# Patient Record
Sex: MALE | Race: OTHER | HISPANIC OR LATINO | Employment: FULL TIME | URBAN - METROPOLITAN AREA
[De-identification: names, ages, dates, MRNs, and addresses within clinical notes are randomized per-mention and may not be internally consistent; named-entity substitution may affect disease eponyms.]

---

## 2022-12-03 ENCOUNTER — HOSPITAL ENCOUNTER (EMERGENCY)
Facility: HOSPITAL | Age: 22
Discharge: HOME | End: 2022-12-03
Attending: EMERGENCY MEDICINE

## 2022-12-03 VITALS
BODY MASS INDEX: 34.57 KG/M2 | HEART RATE: 109 BPM | RESPIRATION RATE: 18 BRPM | TEMPERATURE: 99.5 F | WEIGHT: 246.91 LBS | HEIGHT: 71 IN | SYSTOLIC BLOOD PRESSURE: 104 MMHG | OXYGEN SATURATION: 98 % | DIASTOLIC BLOOD PRESSURE: 70 MMHG

## 2022-12-03 DIAGNOSIS — R68.89 FLU-LIKE SYMPTOMS: Primary | ICD-10-CM

## 2022-12-03 LAB
FLUAV RNA RESP QL NAA+PROBE: POSITIVE
FLUBV RNA RESP QL NAA+PROBE: NEGATIVE
RSV RNA RESP QL NAA+PROBE: NEGATIVE
SARS-COV-2 RNA RESP QL NAA+PROBE: NEGATIVE

## 2022-12-03 RX ORDER — ACETAMINOPHEN 325 MG/1
650 TABLET ORAL ONCE
Status: COMPLETED | OUTPATIENT
Start: 2022-12-03 | End: 2022-12-03

## 2022-12-03 RX ORDER — IBUPROFEN 600 MG/1
600 TABLET ORAL ONCE
Status: COMPLETED | OUTPATIENT
Start: 2022-12-03 | End: 2022-12-03

## 2022-12-03 RX ADMIN — IBUPROFEN 600 MG: 600 TABLET, FILM COATED ORAL at 11:14

## 2022-12-03 RX ADMIN — ACETAMINOPHEN 650 MG: 325 TABLET, FILM COATED ORAL at 11:14

## 2022-12-03 NOTE — ED PROVIDER NOTES
History  Chief Complaint   Patient presents with   • Flu Symptoms     Sore throat, dry cough, headache, and high fevers for 3 days  History provided by:  Patient   used: No    Flu Symptoms  Presenting symptoms: cough, fever, headache, myalgias, rhinorrhea, shortness of breath and sore throat    Presenting symptoms: no nausea and no vomiting    Onset quality:  Sudden  Duration:  3 days  Progression:  Worsening  Chronicity:  New  Relieved by:  Nothing  Worsened by:  Nothing  Ineffective treatments:  None tried  Associated symptoms: chills and nasal congestion    Risk factors comment:  No history of cardiac or pulmonary disease      None       History reviewed  No pertinent past medical history  History reviewed  No pertinent surgical history  History reviewed  No pertinent family history  I have reviewed and agree with the history as documented  E-Cigarette/Vaping     E-Cigarette/Vaping Substances     Social History     Tobacco Use   • Smoking status: Never   • Smokeless tobacco: Never   Substance Use Topics   • Alcohol use: Not Currently   • Drug use: Not Currently       Review of Systems   Constitutional: Positive for chills and fever  HENT: Positive for congestion, rhinorrhea and sore throat  Respiratory: Positive for cough and shortness of breath  Negative for chest tightness  Cardiovascular: Negative for chest pain  Gastrointestinal: Negative for nausea and vomiting  Musculoskeletal: Positive for myalgias  Neurological: Positive for headaches  All other systems reviewed and are negative  Physical Exam  Physical Exam  Vitals and nursing note reviewed  Constitutional:       General: He is not in acute distress  Appearance: Normal appearance  He is well-developed and well-nourished  He is ill-appearing  He is not toxic-appearing, sickly-appearing or diaphoretic  HENT:      Head: Normocephalic and atraumatic        Right Ear: Hearing normal  No drainage or swelling  Left Ear: Hearing normal  No drainage or swelling  Mouth/Throat:      Mouth: Mucous membranes are normal  Mucous membranes are moist       Pharynx: No oropharyngeal exudate or posterior oropharyngeal erythema  Eyes:      General: Lids are normal          Right eye: No discharge  Left eye: No discharge  Conjunctiva/sclera: Conjunctivae normal    Neck:      Vascular: No JVD  Trachea: Trachea normal    Cardiovascular:      Rate and Rhythm: Regular rhythm  Tachycardia present  Pulses: Normal pulses and intact distal pulses  Heart sounds: Normal heart sounds  No murmur heard  No friction rub  No gallop  Pulmonary:      Effort: Pulmonary effort is normal  No respiratory distress  Breath sounds: Normal breath sounds  No stridor  No wheezing or rales  Chest:      Chest wall: No tenderness  Abdominal:      Palpations: Abdomen is soft  Tenderness: There is no abdominal tenderness  There is no CVA tenderness, guarding or rebound  Musculoskeletal:         General: No edema  Normal range of motion  Cervical back: Normal range of motion and neck supple  No rigidity or tenderness  Left lower leg: No edema  Lymphadenopathy:      Cervical: No cervical adenopathy  Skin:     General: Skin is warm, dry and intact  Coloration: Skin is not pale  Neurological:      General: No focal deficit present  Mental Status: He is alert  GCS: GCS eye subscore is 4  GCS verbal subscore is 5  GCS motor subscore is 6  Cranial Nerves: No cranial nerve deficit  Sensory: No sensory deficit  Motor: Motor strength is normal  No abnormal muscle tone  Psychiatric:         Mood and Affect: Mood and affect and mood normal          Speech: Speech normal          Behavior: Behavior is cooperative           Cognition and Memory: Cognition and memory normal          Vital Signs  ED Triage Vitals   Temperature Pulse Respirations Blood Pressure SpO2   12/03/22 1054 12/03/22 1054 12/03/22 1054 12/03/22 1054 12/03/22 1054   (!) 102 °F (38 9 °C) (!) 137 20 107/69 99 %      Temp Source Heart Rate Source Patient Position - Orthostatic VS BP Location FiO2 (%)   12/03/22 1054 12/03/22 1054 12/03/22 1054 12/03/22 1054 --   Oral Monitor Sitting Right arm       Pain Score       12/03/22 1053       8           Vitals:    12/03/22 1054 12/03/22 1210   BP: 107/69 104/70   Pulse: (!) 137 (!) 109   Patient Position - Orthostatic VS: Sitting          Visual Acuity      ED Medications  Medications   acetaminophen (TYLENOL) tablet 650 mg (650 mg Oral Given 12/3/22 1114)   ibuprofen (MOTRIN) tablet 600 mg (600 mg Oral Given 12/3/22 1114)       Diagnostic Studies  Results Reviewed     Procedure Component Value Units Date/Time    FLU/RSV/COVID - if FLU/RSV clinically relevant [[id number]]  (Abnormal) Collected: 12/03/22 1114    Lab Status: Final result Specimen: Nares from Nose Updated: 12/03/22 1217     SARS-CoV-2 Negative     INFLUENZA A PCR Positive     INFLUENZA B PCR Negative     RSV PCR Negative    Narrative:      FOR PEDIATRIC PATIENTS - copy/paste COVID Guidelines URL to browser: [email or web address]    SARS-CoV-2 assay is a Nucleic Acid Amplification assay intended for the  qualitative detection of nucleic acid from SARS-CoV-2 in nasopharyngeal  swabs  Results are for the presumptive identification of SARS-CoV-2 RNA  Positive results are indicative of infection with SARS-CoV-2, the virus  causing COVID-19, but do not rule out bacterial infection or co-infection  with other viruses  Laboratories within the [place] and its  territories are required to report all positive results to the appropriate  public health authorities  Negative results do not preclude SARS-CoV-2  infection and should not be used as the sole basis for treatment or other  patient management decisions   Negative results must be combined with  clinical observations, patient history, and epidemiological information  This test has not been FDA cleared or approved  This test has been authorized by FDA under an Emergency Use Authorization  (EUA)  This test is only authorized for the duration of time the  declaration that circumstances exist justifying the authorization of the  emergency use of an in vitro diagnostic tests for detection of SARS-CoV-2  virus and/or diagnosis of COVID-19 infection under section 564(b)(1) of  the Act, 21 U  S C  379OJW-8(T)(2), unless the authorization is terminated  or revoked sooner  The test has been validated but independent review by FDA  and CLIA is pending  Test performed using [employer] GeneXpert: This RT-PCR assay targets N2,  a region unique to SARS-CoV-2  A conserved region in the E-gene was chosen  for pan-Sarbecovirus detection which includes SARS-CoV-2  According to CMS-2020-01-R, this platform meets the definition of high-throughput technology  No orders to display              Procedures  Procedures         ED Course                               SBIRT 22yo+    Flowsheet Row Most Recent Value   SBIRT (25 yo +)    In order to provide better care to our patients, we are screening all of our patients for alcohol and drug use  Would it be okay to ask you these screening questions? Yes Filed at: 12/03/2022 1102   Initial Alcohol Screen: US AUDIT-C     1  How often do you have a drink containing alcohol? 0 Filed at: 12/03/2022 1102   2  How many drinks containing alcohol do you have on a typical day you are drinking? 0 Filed at: 12/03/2022 1102   3a  Male UNDER 65: How often do you have five or more drinks on one occasion? 0 Filed at: 12/03/2022 1102   Audit-C Score 0 Filed at: 12/03/2022 1102   HENNY: How many times in the past year have you    Used an illegal drug or used a prescription medication for non-medical reasons?  Never Filed at: 12/03/2022 1102                    MDM  Number of Diagnoses or Management Options  Flu-like symptoms  Diagnosis management comments: Presumed influenza  Test sent  Vitals improved  Will need antipyretics and the results of the test will be back later today her to sign up for MyChart  Outside the window for treatment for fluid he has no major risk factors  His oxygen saturation is 98% on room air  His lungs are clear bilaterally  Amount and/or Complexity of Data Reviewed  Clinical lab tests: ordered    Patient Progress  Patient progress: stable      Disposition  Final diagnoses:   Flu-like symptoms     Time reflects when diagnosis was documented in both MDM as applicable and the Disposition within this note     Time User Action Codes Description Comment    12/3/2022 12:12 PM Estiven Rivera Add [R68 [id number]] Flu-like symptoms       ED Disposition     ED Disposition   Discharge    Condition   Stable    Date/Time   Sat Dec 3, 2022 12:12 PM    Comment   Abhijit Olmstead discharge to home/self care                 Follow-up Information     Follow up With Specialties Details Why Contact Info Additional [address] [facility] Call  to get a primary care doctor or arrange follow up [address], [address] [facility] [id number]  [address], [address], [address] [facility], [address]          Patient's Medications    No medications on file           PDMP Review     None          ED Provider  Electronically Signed by           Ye Snowden MD  12/03/22 6005

## 2022-12-03 NOTE — Clinical Note
Kamila Starks was seen and treated in our emergency department on 12/3/2022  Diagnosis: Presumed influenza    Sarah Patricio  may return to work on return date  He may return on this date: 12/07/2022         If you have any questions or concerns, please don't hesitate to call        Ye Snowden MD    ______________________________           _______________          _______________  Hospital Representative                              Date                                Time

## 2022-12-07 ENCOUNTER — TELEPHONE (OUTPATIENT)
Dept: FAMILY MEDICINE CLINIC | Facility: CLINIC | Age: 22
End: 2022-12-07

## 2022-12-07 NOTE — TELEPHONE ENCOUNTER
Patient stated he would call back to make an appointment once he verified insurance with mom. Phone number and address was given to him.

## 2023-10-14 ENCOUNTER — APPOINTMENT (OUTPATIENT)
Dept: URGENT CARE | Age: 23
End: 2023-10-14
Payer: OTHER MISCELLANEOUS

## 2023-10-14 PROCEDURE — 99283 EMERGENCY DEPT VISIT LOW MDM: CPT | Performed by: ORTHOPAEDIC SURGERY

## 2023-10-14 PROCEDURE — G0382 LEV 3 HOSP TYPE B ED VISIT: HCPCS | Performed by: ORTHOPAEDIC SURGERY

## 2023-10-17 ENCOUNTER — APPOINTMENT (OUTPATIENT)
Dept: URGENT CARE | Age: 23
End: 2023-10-17
Payer: OTHER MISCELLANEOUS

## 2023-10-17 PROCEDURE — 99213 OFFICE O/P EST LOW 20 MIN: CPT
